# Patient Record
Sex: MALE | Race: OTHER | HISPANIC OR LATINO | URBAN - METROPOLITAN AREA
[De-identification: names, ages, dates, MRNs, and addresses within clinical notes are randomized per-mention and may not be internally consistent; named-entity substitution may affect disease eponyms.]

---

## 2017-01-26 ENCOUNTER — EMERGENCY (EMERGENCY)
Facility: HOSPITAL | Age: 62
LOS: 1 days | Discharge: HOME | End: 2017-01-26
Attending: EMERGENCY MEDICINE
Payer: SELF-PAY

## 2017-01-26 VITALS
RESPIRATION RATE: 16 BRPM | HEART RATE: 72 BPM | WEIGHT: 153 LBS | TEMPERATURE: 99 F | DIASTOLIC BLOOD PRESSURE: 87 MMHG | SYSTOLIC BLOOD PRESSURE: 144 MMHG | HEIGHT: 65 IN
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
## 2017-01-26 DIAGNOSIS — S61.210A LACERATION WITHOUT FOREIGN BODY OF RIGHT INDEX FINGER WITHOUT DAMAGE TO NAIL, INITIAL ENCOUNTER: ICD-10-CM
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
## 2017-01-26 DIAGNOSIS — Z23 ENCOUNTER FOR IMMUNIZATION: ICD-10-CM

## 2017-01-26 PROCEDURE — 73140 X-RAY EXAM OF FINGER(S): CPT | Mod: 26,RT

## 2017-01-26 PROCEDURE — 90715 TDAP VACCINE 7 YRS/> IM: CPT

## 2017-01-26 PROCEDURE — 73140 X-RAY EXAM OF FINGER(S): CPT

## 2017-01-26 PROCEDURE — T1013: CPT

## 2017-01-26 PROCEDURE — 99283 EMERGENCY DEPT VISIT LOW MDM: CPT | Mod: 25

## 2017-01-26 PROCEDURE — 90471 IMMUNIZATION ADMIN: CPT

## 2017-01-26 PROCEDURE — 99283 EMERGENCY DEPT VISIT LOW MDM: CPT

## 2017-01-26 RX ORDER — CEPHALEXIN 500 MG
1 CAPSULE ORAL
Qty: 20 | Refills: 0 | OUTPATIENT
Start: 2017-01-26 | End: 2017-02-05

## 2017-01-26 RX ORDER — TETANUS TOXOID, REDUCED DIPHTHERIA TOXOID AND ACELLULAR PERTUSSIS VACCINE, ADSORBED 5; 2.5; 8; 8; 2.5 [IU]/.5ML; [IU]/.5ML; UG/.5ML; UG/.5ML; UG/.5ML
0.5 SUSPENSION INTRAMUSCULAR ONCE
Qty: 0 | Refills: 0 | Status: COMPLETED | OUTPATIENT
Start: 2017-01-26 | End: 2017-01-26

## 2017-01-26 RX ORDER — CEPHALEXIN 500 MG
500 CAPSULE ORAL ONCE
Qty: 0 | Refills: 0 | Status: COMPLETED | OUTPATIENT
Start: 2017-01-26 | End: 2017-01-26

## 2017-01-26 RX ORDER — IBUPROFEN 200 MG
400 TABLET ORAL ONCE
Qty: 0 | Refills: 0 | Status: COMPLETED | OUTPATIENT
Start: 2017-01-26 | End: 2017-01-26

## 2017-01-26 RX ADMIN — Medication 400 MILLIGRAM(S): at 19:48

## 2017-01-26 RX ADMIN — Medication 500 MILLIGRAM(S): at 19:48

## 2017-01-26 RX ADMIN — TETANUS TOXOID, REDUCED DIPHTHERIA TOXOID AND ACELLULAR PERTUSSIS VACCINE, ADSORBED 0.5 MILLILITER(S): 5; 2.5; 8; 8; 2.5 SUSPENSION INTRAMUSCULAR at 19:49

## 2017-01-26 NOTE — ED STATDOCS - PROGRESS NOTE DETAILS
Patient presents with laceration to right 2nd MCP joint s/p cutting with broken glass, TD UTD 16 months ago. ?? fb on xray not near wound, will suture and dispo Patient presents with laceration to right 2nd MCP joint s/p cutting with broken glass, TD?? 16 months ago. ?? fb on xray not near wound, will suture and dispo Patient told MD last TD was 3 months ago, however he states he is now unsure Patient presents with laceration to right 2nd MCP joint s/p cutting with broken glass, TD?? 16 months ago. ?? fb on xray not near wound, will suture and dispo.  Positive 3 cm linear laceration over dorsal 2nd MCP, no tendon laceration. FROM

## 2017-01-26 NOTE — ED STATDOCS - NS ED MD SCRIBE ATTENDING SCRIBE SECTIONS
PHYSICAL EXAM/DISPOSITION/PAST MEDICAL/SURGICAL/SOCIAL HISTORY/VITAL SIGNS( Pullset)/REVIEW OF SYSTEMS/HIV/HISTORY OF PRESENT ILLNESS/INTAKE ASSESSMENT/SCREENINGS

## 2017-01-26 NOTE — ED STATDOCS - SKIN, MLM
1.5 cm lac over the mediocarpal head of the right index finger. Well healed scar over volar aspect of right thumb.

## 2017-01-26 NOTE — ED STATDOCS - ATTENDING CONTRIBUTION TO CARE
I, Víctor Degroot, performed the initial face to face bedside interview with this patient regarding history of present illness, review of symptoms and relevant past medical, social and family history.  I completed an independent physical examination.  I was the initial provider who evaluated this patient. I have signed out the follow up of any pending tests (i.e. labs, radiological studies) to the ACP.  I have communicated the patient’s plan of care and disposition with the ACP.  The history, relevant review of systems, past medical and surgical history, medical decision making, and physical examination was documented by the scribe in my presence and I attest to the accuracy of the documentation.

## 2017-01-26 NOTE — ED STATDOCS - OBJECTIVE STATEMENT
60 y/o male with surgery to the right thumb presents to ED c/o lacs to the right hand about 2 hours ago, sustained while washing dishes. Pt's hand wad in a glass; it broke and he cut himself. Right hand dominant. Admits to being a heavy drinker. Last tetanus was 3 months ago. Pt is on medication for his liver. Denies fever, chills. NKDA. No further complaints at this time.

## 2017-01-26 NOTE — ED STATDOCS - MEDICAL DECISION MAKING DETAILS
Lac to mediocarpal head of right index finger. XR to evaluate for bony injury. Simple closure. Reassess for tendon injury after XR/ irrigation, but extensors appear intact on PE.

## 2017-10-03 NOTE — PROCEDURE NOTE - NSCOMPLICATION_GEN_A_CORE
Have Your Skin Lesions Been Treated?: not been treated
Is This A New Presentation, Or A Follow-Up?: Skin Lesions
How Severe Is Your Skin Lesion?: mild
no complications

## 2018-07-10 NOTE — ED ADULT TRIAGE NOTE - CCCP TRG CHIEF CMPLNT
[Weight management counseling provided] : Weight management [Healthy eating counseling provided] : healthy eating [Activity counseling provided] : activity lacerations

## 2023-03-22 NOTE — ED ADULT TRIAGE NOTE - MODE OF ARRIVAL
AM assessment complete, /85, pt A&Ox4 in bed, c/o pain to left lower abd 6/10. POC and education reviewed with the pt. All needs met at this time, call light in reach, will continue to monitor. Walk in